# Patient Record
Sex: MALE | Race: WHITE | Employment: FULL TIME | URBAN - METROPOLITAN AREA
[De-identification: names, ages, dates, MRNs, and addresses within clinical notes are randomized per-mention and may not be internally consistent; named-entity substitution may affect disease eponyms.]

---

## 2019-03-21 PROBLEM — Z79.899 DRUG-INDUCED IMMUNODEFICIENCY (HCC): Status: ACTIVE | Noted: 2018-06-28

## 2019-03-21 PROBLEM — S43.431A SUPERIOR GLENOID LABRUM LESION OF RIGHT SHOULDER: Status: ACTIVE | Noted: 2018-06-01

## 2019-03-21 PROBLEM — M48.10 DISH (DIFFUSE IDIOPATHIC SKELETAL HYPEROSTOSIS): Status: ACTIVE | Noted: 2018-06-01

## 2019-03-21 PROBLEM — D84.821 DRUG-INDUCED IMMUNODEFICIENCY (HCC): Status: ACTIVE | Noted: 2018-06-28

## 2019-03-21 PROBLEM — N48.6 PEYRONIE'S DISEASE: Status: ACTIVE | Noted: 2018-10-31

## 2019-03-21 PROBLEM — M72.2 PLANTAR FASCIITIS: Status: ACTIVE | Noted: 2018-06-01

## 2019-03-21 PROBLEM — M47.819 SPONDYLOARTHROPATHY: Status: ACTIVE | Noted: 2018-06-01

## 2019-09-04 ENCOUNTER — HOSPITAL ENCOUNTER (OUTPATIENT)
Dept: LAB | Age: 49
Discharge: HOME | End: 2019-09-04
Payer: COMMERCIAL

## 2019-09-04 ENCOUNTER — HOSPITAL ENCOUNTER (OUTPATIENT)
Dept: GENERAL RADIOLOGY | Age: 49
Discharge: HOME | End: 2019-09-04
Payer: COMMERCIAL

## 2019-09-04 DIAGNOSIS — N48.6 PEYRONIE'S DISEASE: ICD-10-CM

## 2019-09-04 LAB
ANION GAP SERPL CALC-SCNC: 6 MMOL/L (ref 3–18)
BASOPHILS # BLD: 0.1 K/UL (ref 0–0.1)
BASOPHILS NFR BLD: 1 % (ref 0–2)
BUN SERPL-MCNC: 13 MG/DL (ref 7–18)
BUN/CREAT SERPL: 13 (ref 12–20)
CALCIUM SERPL-MCNC: 9.1 MG/DL (ref 8.5–10.1)
CHLORIDE SERPL-SCNC: 105 MMOL/L (ref 100–111)
CO2 SERPL-SCNC: 28 MMOL/L (ref 21–32)
CREAT SERPL-MCNC: 0.97 MG/DL (ref 0.6–1.3)
DIFFERENTIAL METHOD BLD: ABNORMAL
EOSINOPHIL # BLD: 0.1 K/UL (ref 0–0.4)
EOSINOPHIL NFR BLD: 2 % (ref 0–5)
ERYTHROCYTE [DISTWIDTH] IN BLOOD BY AUTOMATED COUNT: 11.5 % (ref 11.6–14.5)
GLUCOSE SERPL-MCNC: 113 MG/DL (ref 74–99)
HCT VFR BLD AUTO: 41.7 % (ref 36–48)
HGB BLD-MCNC: 14.2 G/DL (ref 13–16)
LYMPHOCYTES # BLD: 2.9 K/UL (ref 0.9–3.6)
LYMPHOCYTES NFR BLD: 38 % (ref 21–52)
MCH RBC QN AUTO: 32.5 PG (ref 24–34)
MCHC RBC AUTO-ENTMCNC: 34.1 G/DL (ref 31–37)
MCV RBC AUTO: 95.4 FL (ref 74–97)
MONOCYTES # BLD: 1 K/UL (ref 0.05–1.2)
MONOCYTES NFR BLD: 13 % (ref 3–10)
NEUTS SEG # BLD: 3.6 K/UL (ref 1.8–8)
NEUTS SEG NFR BLD: 46 % (ref 40–73)
PLATELET # BLD AUTO: 335 K/UL (ref 135–420)
PMV BLD AUTO: 10.2 FL (ref 9.2–11.8)
POTASSIUM SERPL-SCNC: 4.6 MMOL/L (ref 3.5–5.5)
RBC # BLD AUTO: 4.37 M/UL (ref 4.7–5.5)
SODIUM SERPL-SCNC: 139 MMOL/L (ref 136–145)
WBC # BLD AUTO: 7.7 K/UL (ref 4.6–13.2)

## 2019-09-04 PROCEDURE — 36415 COLL VENOUS BLD VENIPUNCTURE: CPT

## 2019-09-04 PROCEDURE — 85025 COMPLETE CBC W/AUTO DIFF WBC: CPT

## 2019-09-04 PROCEDURE — 71046 X-RAY EXAM CHEST 2 VIEWS: CPT

## 2019-09-04 PROCEDURE — 80048 BASIC METABOLIC PNL TOTAL CA: CPT

## 2020-01-15 PROBLEM — M54.2 CERVICALGIA: Status: ACTIVE | Noted: 2020-01-10

## 2020-01-15 PROBLEM — M48.061 SPINAL STENOSIS OF LUMBAR REGION: Status: ACTIVE | Noted: 2019-11-13

## 2020-01-15 PROBLEM — G56.03 CARPAL TUNNEL SYNDROME, BILATERAL: Status: ACTIVE | Noted: 2020-01-10

## 2020-12-29 ENCOUNTER — TRANSCRIBE ORDER (OUTPATIENT)
Dept: SCHEDULING | Age: 50
End: 2020-12-29

## 2020-12-29 DIAGNOSIS — R06.02 SHORTNESS OF BREATH: Primary | ICD-10-CM

## 2020-12-29 DIAGNOSIS — R91.8 OTHER NONSPECIFIC ABNORMAL FINDING OF LUNG FIELD: ICD-10-CM

## 2021-01-04 ENCOUNTER — HOSPITAL ENCOUNTER (EMERGENCY)
Age: 51
Discharge: HOME | End: 2021-01-04
Attending: EMERGENCY MEDICINE
Payer: COMMERCIAL

## 2021-01-04 VITALS
OXYGEN SATURATION: 100 % | RESPIRATION RATE: 16 BRPM | DIASTOLIC BLOOD PRESSURE: 80 MMHG | HEART RATE: 88 BPM | TEMPERATURE: 97.3 F | SYSTOLIC BLOOD PRESSURE: 133 MMHG | WEIGHT: 220 LBS | BODY MASS INDEX: 35.36 KG/M2 | HEIGHT: 66 IN

## 2021-01-04 DIAGNOSIS — J06.9 VIRAL UPPER RESPIRATORY TRACT INFECTION: Primary | ICD-10-CM

## 2021-01-04 DIAGNOSIS — Z20.822 PERSON UNDER INVESTIGATION FOR COVID-19: ICD-10-CM

## 2021-01-04 PROCEDURE — 99282 EMERGENCY DEPT VISIT SF MDM: CPT

## 2021-01-04 PROCEDURE — 87635 SARS-COV-2 COVID-19 AMP PRB: CPT

## 2021-01-04 RX ORDER — ALBUTEROL SULFATE 90 UG/1
2 AEROSOL, METERED RESPIRATORY (INHALATION)
Qty: 1 INHALER | Refills: 0 | Status: SHIPPED | OUTPATIENT
Start: 2021-01-04 | End: 2021-02-15 | Stop reason: CLARIF

## 2021-01-04 NOTE — ED PROVIDER NOTES
EMERGENCY DEPARTMENT HISTORY AND PHYSICAL EXAM    Date: 1/4/2021  Patient Name: Melissa Palacio    History of Presenting Illness     Chief Complaint   Patient presents with    Covid Testing         History Provided By: Patient    5:40 PM  Melissa Palacio is a 45 y.o. male with PMHX of hypertension who presents to the emergency department C/O fatigue, sweats, chills, diarrhea, nonproductive cough and body aches which began 4 days ago. Patient is a  and states he received COVID-19 vaccine 5 days ago. Called his PCP, who suggested being tested for COVID-19. Was around a friend who tested positive for COVID-19 but states he was wearing a mask around him. Patient wears CPAP at night but states he has been a little short of breath the last 2 nights. Pt denies fever, vomiting, diarrhea, loss of taste or smell, and any other sxs or complaints. PCP: Jorje Houston MD    Current Outpatient Medications   Medication Sig Dispense Refill    albuterol (PROVENTIL HFA, VENTOLIN HFA, PROAIR HFA) 90 mcg/actuation inhaler Take 2 Puffs by inhalation every four (4) hours as needed for Wheezing. 1 Inhaler 0    ALPRAZolam (XANAX) 1 mg tablet TAKE 1/2 TO 1 TABLET BY MOUTH FOR PANIC ATTACK. NO MORE THAN ONCE DAILY  0    sildenafil, pulm. hypertension, (REVATIO) 20 mg tablet Take 1-5 tabs by mouth one hour prior to sexual activity 90 Tab 3    ibuprofen (MOTRIN) 800 mg tablet TAKE 1 TABLET BY MOUTH 3 TIMES A DAY WITH FOOD  0    gabapentin (NEURONTIN) 300 mg capsule   0    lisinopril (PRINIVIL, ZESTRIL) 20 mg tablet   0    adalimumab (HUMIRA PEN) 40 mg/0.8 mL injection pen by SubCUTAneous route.  metoprolol succinate (TOPROL-XL) 25 mg XL tablet   0    omeprazole (PRILOSEC) 10 mg capsule Take 10 mg by mouth.          Past History     Past Medical History:  Past Medical History:   Diagnosis Date    Hypertension     Peyronie's disease     Umbilical hernia        Past Surgical History:  Past Surgical History: Procedure Laterality Date    HX HERNIA REPAIR      HX SHOULDER ARTHROSCOPY Left     HX UROLOGICAL  09/20/2019    Penis correction/ straightening - [facility] - Dr. Miranda Hackett       Family History:  History reviewed. No pertinent family history. Social History:  Social History     Tobacco Use    Smoking status: Never Smoker    Smokeless tobacco: Never Used   Substance Use Topics    Alcohol use: Yes     Frequency: 2-4 times a month    Drug use: Never       Allergies:  No Known Allergies      Review of Systems   Review of Systems      Physical Exam     Vitals:    01/04/21 1712   BP: 133/80   Pulse: 88   Resp: 16   Temp: 97.3 °F (36.3 °C)   SpO2: 100%   Weight: 99.8 kg (220 lb)   Height: 5' 6\" (1.676 m)     Physical Exam  Vital signs and nursing notes reviewed. CONSTITUTIONAL: Alert. Well-appearing; well-nourished; in no apparent distress. HEAD: Normocephalic; atraumatic. CV: Normal S1, S2; no murmurs, rubs, or gallops. No chest wall tenderness. RESPIRATORY: Normal chest excursion with respiration; breath sounds clear and equal bilaterally; no wheezes, rhonchi, or rales. SKIN: Normal for age and race; warm; dry; good turgor; no apparent lesions or exudate. NEURO: A & O x3. PSYCH:  Mood and affect appropriate. Diagnostic Study Results     Labs -   No results found for this or any previous visit (from the past 12 hour(s)). Radiologic Studies -   No orders to display     CT Results  (Last 48 hours)    None        CXR Results  (Last 48 hours)    None          Medications given in the ED-  Medications - No data to display      Medical Decision Making   I am the first provider for this patient. I reviewed the vital signs, available nursing notes, past medical history, past surgical history, family history and social history. Vital Signs-Reviewed the patient's vital signs.     Records Reviewed: Nursing Notes      Procedures:  Procedures    ED Course:  5:40 PM   Initial assessment performed. The patients presenting problems have been discussed, and they are in agreement with the care plan formulated and outlined with them. I have encouraged them to ask questions as they arise throughout their visit. Provider Notes (Medical Decision Making): Loida Carcamo is a 45 y.o. male 4 days of URI symptoms and possible COVID-19 exposure. Also received first COVID-19 vaccine 5 days ago. Vitals stable. Not hypoxic or tachypneic and exam is normal.  Over-the-counter symptomatic treatment discussed. COVID-19 test sent and pending at this time. Strict isolation per CDC guidelines return precautions discussed. Diagnosis and Disposition       DISCHARGE NOTE:    Cristopher Spangler's  results have been reviewed with him. He has been counseled regarding his diagnosis, treatment, and plan. He verbally conveys understanding and agreement of the signs, symptoms, diagnosis, treatment and prognosis and additionally agrees to follow up as discussed. He also agrees with the care-plan and conveys that all of his questions have been answered. I have also provided discharge instructions for him that include: educational information regarding their diagnosis and treatment, and list of reasons why they would want to return to the ED prior to their follow-up appointment, should his condition change. He has been provided with education for proper emergency department utilization. CLINICAL IMPRESSION:    1. Viral upper respiratory tract infection    2. Person under investigation for COVID-19        PLAN:  1. D/C Home  2. Current Discharge Medication List      START taking these medications    Details   albuterol (PROVENTIL HFA, VENTOLIN HFA, PROAIR HFA) 90 mcg/actuation inhaler Take 2 Puffs by inhalation every four (4) hours as needed for Wheezing. Qty: 1 Inhaler, Refills: 0           3.    Follow-up Information     Follow up With Specialties Details Why Contact Info    Noemi Lira MD Family Medicine  As needed [address] [place] [address]  [phone]      [facility] EMERGENCY DEPT Emergency Medicine  As needed, If symptoms worsen [address] [id number]  [phone]        _______________________________      Please note that this dictation was completed with [employer], the computer voice recognition software. Quite often unanticipated grammatical, syntax, homophones, and other interpretive errors are inadvertently transcribed by the computer software. Please disregard these errors. Please excuse any errors that have escaped final proofreading.

## 2021-01-05 ENCOUNTER — PATIENT OUTREACH (OUTPATIENT)
Dept: CASE MANAGEMENT | Age: 51
End: 2021-01-05

## 2021-01-05 LAB
SARS-COV-2, COV2NT: NOT DETECTED
SOURCE, COVRS: NORMAL
SPECIMEN TYPE, XMCV1T: NORMAL

## 2021-01-12 ENCOUNTER — HOSPITAL ENCOUNTER (OUTPATIENT)
Dept: PET IMAGING | Age: 51
Discharge: HOME | End: 2021-01-12
Attending: NURSE PRACTITIONER
Payer: COMMERCIAL

## 2021-01-12 DIAGNOSIS — R06.02 SHORTNESS OF BREATH: ICD-10-CM

## 2021-01-12 DIAGNOSIS — R91.8 OTHER NONSPECIFIC ABNORMAL FINDING OF LUNG FIELD: ICD-10-CM

## 2021-01-12 PROCEDURE — 78815 PET IMAGE W/CT SKULL-THIGH: CPT

## 2021-02-15 NOTE — H&P
39-year-old male with autoimmune disorder presents with severe lymphadenopathy. Patient used to be on immunosuppression including Humira. Lymphadenopathy could be related to lymphoma or through to out immune disorder including sarcoidosis. Biopsy plan. All discussed with patient. With patient to hold taking aspirin and ibuprofen prior to procedure.   Lukas Grubbs MD

## 2021-02-16 ENCOUNTER — HOSPITAL ENCOUNTER (OUTPATIENT)
Age: 51
Setting detail: OUTPATIENT SURGERY
Discharge: HOME | End: 2021-02-16
Attending: INTERNAL MEDICINE | Admitting: INTERNAL MEDICINE
Payer: COMMERCIAL

## 2021-02-16 ENCOUNTER — APPOINTMENT (OUTPATIENT)
Dept: GENERAL RADIOLOGY | Age: 51
End: 2021-02-16
Attending: INTERNAL MEDICINE
Payer: COMMERCIAL

## 2021-02-16 ENCOUNTER — ANESTHESIA (OUTPATIENT)
Dept: ENDOSCOPY | Age: 51
End: 2021-02-16
Payer: COMMERCIAL

## 2021-02-16 ENCOUNTER — ANESTHESIA EVENT (OUTPATIENT)
Dept: ENDOSCOPY | Age: 51
End: 2021-02-16
Payer: COMMERCIAL

## 2021-02-16 VITALS
SYSTOLIC BLOOD PRESSURE: 118 MMHG | WEIGHT: 217 LBS | DIASTOLIC BLOOD PRESSURE: 82 MMHG | HEIGHT: 66 IN | TEMPERATURE: 97.6 F | OXYGEN SATURATION: 98 % | HEART RATE: 93 BPM | BODY MASS INDEX: 34.87 KG/M2 | RESPIRATION RATE: 15 BRPM

## 2021-02-16 LAB
ANION GAP SERPL CALC-SCNC: 7 MMOL/L (ref 3–18)
APTT PPP: 29.6 SEC (ref 23–36.4)
ATRIAL RATE: 79 BPM
BRONCH. LAVAGE DIFF.,BR: NORMAL
BUN SERPL-MCNC: 13 MG/DL (ref 7–18)
BUN/CREAT SERPL: 18 (ref 12–20)
CALCIUM SERPL-MCNC: 9.3 MG/DL (ref 8.5–10.1)
CALCULATED P AXIS, ECG09: 43 DEGREES
CALCULATED R AXIS, ECG10: 54 DEGREES
CALCULATED T AXIS, ECG11: 10 DEGREES
CHLORIDE SERPL-SCNC: 104 MMOL/L (ref 100–111)
CO2 SERPL-SCNC: 26 MMOL/L (ref 21–32)
COVID-19 RAPID TEST, COVR: NOT DETECTED
CREAT SERPL-MCNC: 0.71 MG/DL (ref 0.6–1.3)
DIAGNOSIS, 93000: NORMAL
EOSINOPHIL NFR BRONCH MANUAL: 0 %
GLUCOSE SERPL-MCNC: 172 MG/DL (ref 74–99)
HCT VFR BLD AUTO: 42.3 % (ref 36–48)
HGB BLD-MCNC: 14.6 G/DL (ref 13–16)
INR PPP: 1 (ref 0.8–1.2)
LYMPHOCYTES NFR BRONCH MANUAL: 43 %
MACROPHAGES NFR BRONCH MANUAL: 0 %
NEUTROPHILS NFR BRONCH MANUAL: 57 %
P-R INTERVAL, ECG05: 156 MS
POTASSIUM SERPL-SCNC: 4.5 MMOL/L (ref 3.5–5.5)
PROTHROMBIN TIME: 13.1 SEC (ref 11.5–15.2)
Q-T INTERVAL, ECG07: 368 MS
QRS DURATION, ECG06: 102 MS
QTC CALCULATION (BEZET), ECG08: 421 MS
SARS-COV-2, COV2: NORMAL
SODIUM SERPL-SCNC: 137 MMOL/L (ref 136–145)
SOURCE, COVRS: NORMAL
VENTRICULAR RATE, ECG03: 79 BPM

## 2021-02-16 PROCEDURE — 77030003454 HC NDL BIOP BRONCH BSC -B: Performed by: INTERNAL MEDICINE

## 2021-02-16 PROCEDURE — 77030040361 HC SLV COMPR DVT MDII -B: Performed by: INTERNAL MEDICINE

## 2021-02-16 PROCEDURE — 80048 BASIC METABOLIC PNL TOTAL CA: CPT

## 2021-02-16 PROCEDURE — 77030022556 HC FCPS BIOP TIS ENDOSC BSC -B: Performed by: INTERNAL MEDICINE

## 2021-02-16 PROCEDURE — 71045 X-RAY EXAM CHEST 1 VIEW: CPT

## 2021-02-16 PROCEDURE — 87206 SMEAR FLUORESCENT/ACID STAI: CPT

## 2021-02-16 PROCEDURE — 88305 TISSUE EXAM BY PATHOLOGIST: CPT

## 2021-02-16 PROCEDURE — 76060000033 HC ANESTHESIA 1 TO 1.5 HR: Performed by: INTERNAL MEDICINE

## 2021-02-16 PROCEDURE — U0003 INFECTIOUS AGENT DETECTION BY NUCLEIC ACID (DNA OR RNA); SEVERE ACUTE RESPIRATORY SYNDROME CORONAVIRUS 2 (SARS-COV-2) (CORONAVIRUS DISEASE [COVID-19]), AMPLIFIED PROBE TECHNIQUE, MAKING USE OF HIGH THROUGHPUT TECHNOLOGIES AS DESCRIBED BY CMS-2020-01-R: HCPCS

## 2021-02-16 PROCEDURE — 93005 ELECTROCARDIOGRAM TRACING: CPT

## 2021-02-16 PROCEDURE — 85610 PROTHROMBIN TIME: CPT

## 2021-02-16 PROCEDURE — 85730 THROMBOPLASTIN TIME PARTIAL: CPT

## 2021-02-16 PROCEDURE — 77030006643: Performed by: ANESTHESIOLOGY

## 2021-02-16 PROCEDURE — 74011000258 HC RX REV CODE- 258: Performed by: NURSE ANESTHETIST, CERTIFIED REGISTERED

## 2021-02-16 PROCEDURE — 87205 SMEAR GRAM STAIN: CPT

## 2021-02-16 PROCEDURE — 85018 HEMOGLOBIN: CPT

## 2021-02-16 PROCEDURE — 88185 FLOWCYTOMETRY/TC ADD-ON: CPT

## 2021-02-16 PROCEDURE — 77030008477 HC STYL SATN SLP COVD -A: Performed by: ANESTHESIOLOGY

## 2021-02-16 PROCEDURE — 87116 MYCOBACTERIA CULTURE: CPT

## 2021-02-16 PROCEDURE — 74011000250 HC RX REV CODE- 250: Performed by: NURSE ANESTHETIST, CERTIFIED REGISTERED

## 2021-02-16 PROCEDURE — 87102 FUNGUS ISOLATION CULTURE: CPT

## 2021-02-16 PROCEDURE — 88112 CYTOPATH CELL ENHANCE TECH: CPT

## 2021-02-16 PROCEDURE — 77030003400 HC NDL ASPIR BIOP CNMD -B: Performed by: INTERNAL MEDICINE

## 2021-02-16 PROCEDURE — 74011250636 HC RX REV CODE- 250/636: Performed by: NURSE ANESTHETIST, CERTIFIED REGISTERED

## 2021-02-16 PROCEDURE — 77030018823 HC SLV COMPR VENO -B: Performed by: INTERNAL MEDICINE

## 2021-02-16 PROCEDURE — 76040000008: Performed by: INTERNAL MEDICINE

## 2021-02-16 PROCEDURE — 74011250636 HC RX REV CODE- 250/636: Performed by: INTERNAL MEDICINE

## 2021-02-16 PROCEDURE — 88172 CYTP DX EVAL FNA 1ST EA SITE: CPT

## 2021-02-16 PROCEDURE — 77030013140 HC MSK NEB VYRM -A: Performed by: INTERNAL MEDICINE

## 2021-02-16 PROCEDURE — 77030012699 HC VLV SUC CNTRL OCOA -A: Performed by: INTERNAL MEDICINE

## 2021-02-16 PROCEDURE — 2709999900 HC NON-CHARGEABLE SUPPLY: Performed by: INTERNAL MEDICINE

## 2021-02-16 PROCEDURE — 36415 COLL VENOUS BLD VENIPUNCTURE: CPT

## 2021-02-16 PROCEDURE — 88184 FLOWCYTOMETRY/ TC 1 MARKER: CPT

## 2021-02-16 PROCEDURE — 89051 BODY FLUID CELL COUNT: CPT

## 2021-02-16 PROCEDURE — 88173 CYTOPATH EVAL FNA REPORT: CPT

## 2021-02-16 PROCEDURE — 87635 SARS-COV-2 COVID-19 AMP PRB: CPT

## 2021-02-16 RX ORDER — ONDANSETRON 2 MG/ML
INJECTION INTRAMUSCULAR; INTRAVENOUS AS NEEDED
Status: DISCONTINUED | OUTPATIENT
Start: 2021-02-16 | End: 2021-02-16 | Stop reason: HOSPADM

## 2021-02-16 RX ORDER — SODIUM CHLORIDE 0.9 % (FLUSH) 0.9 %
5-40 SYRINGE (ML) INJECTION AS NEEDED
Status: CANCELLED | OUTPATIENT
Start: 2021-02-16

## 2021-02-16 RX ORDER — FENTANYL CITRATE 50 UG/ML
25 INJECTION, SOLUTION INTRAMUSCULAR; INTRAVENOUS AS NEEDED
Status: CANCELLED | OUTPATIENT
Start: 2021-02-16

## 2021-02-16 RX ORDER — ALBUTEROL SULFATE 0.83 MG/ML
2.5 SOLUTION RESPIRATORY (INHALATION)
Status: CANCELLED | OUTPATIENT
Start: 2021-02-16 | End: 2021-02-17

## 2021-02-16 RX ORDER — SODIUM CHLORIDE 9 MG/ML
500 INJECTION, SOLUTION INTRAVENOUS CONTINUOUS
Status: DISCONTINUED | OUTPATIENT
Start: 2021-02-16 | End: 2021-02-16 | Stop reason: HOSPADM

## 2021-02-16 RX ORDER — SUCCINYLCHOLINE CHLORIDE 100 MG/5ML
SYRINGE (ML) INTRAVENOUS AS NEEDED
Status: DISCONTINUED | OUTPATIENT
Start: 2021-02-16 | End: 2021-02-16 | Stop reason: HOSPADM

## 2021-02-16 RX ORDER — LIDOCAINE HYDROCHLORIDE 20 MG/ML
5 SOLUTION OROPHARYNGEAL ONCE
Status: CANCELLED | OUTPATIENT
Start: 2021-02-16 | End: 2021-02-16

## 2021-02-16 RX ORDER — PROPOFOL 10 MG/ML
INJECTION, EMULSION INTRAVENOUS AS NEEDED
Status: DISCONTINUED | OUTPATIENT
Start: 2021-02-16 | End: 2021-02-16 | Stop reason: HOSPADM

## 2021-02-16 RX ORDER — HYDROMORPHONE HYDROCHLORIDE 2 MG/ML
0.5 INJECTION, SOLUTION INTRAMUSCULAR; INTRAVENOUS; SUBCUTANEOUS
Status: CANCELLED | OUTPATIENT
Start: 2021-02-16

## 2021-02-16 RX ORDER — SODIUM CHLORIDE 0.9 % (FLUSH) 0.9 %
5-40 SYRINGE (ML) INJECTION EVERY 8 HOURS
Status: CANCELLED | OUTPATIENT
Start: 2021-02-16

## 2021-02-16 RX ORDER — SODIUM CHLORIDE, SODIUM LACTATE, POTASSIUM CHLORIDE, CALCIUM CHLORIDE 600; 310; 30; 20 MG/100ML; MG/100ML; MG/100ML; MG/100ML
100 INJECTION, SOLUTION INTRAVENOUS CONTINUOUS
Status: CANCELLED | OUTPATIENT
Start: 2021-02-16

## 2021-02-16 RX ORDER — NALOXONE HYDROCHLORIDE 0.4 MG/ML
0.1 INJECTION, SOLUTION INTRAMUSCULAR; INTRAVENOUS; SUBCUTANEOUS AS NEEDED
Status: CANCELLED | OUTPATIENT
Start: 2021-02-16

## 2021-02-16 RX ORDER — IPRATROPIUM BROMIDE AND ALBUTEROL SULFATE 2.5; .5 MG/3ML; MG/3ML
3 SOLUTION RESPIRATORY (INHALATION)
Status: DISCONTINUED | OUTPATIENT
Start: 2021-02-16 | End: 2021-02-16 | Stop reason: HOSPADM

## 2021-02-16 RX ORDER — DIPHENHYDRAMINE HYDROCHLORIDE 50 MG/ML
12.5 INJECTION, SOLUTION INTRAMUSCULAR; INTRAVENOUS
Status: CANCELLED | OUTPATIENT
Start: 2021-02-16

## 2021-02-16 RX ORDER — DEXAMETHASONE SODIUM PHOSPHATE 4 MG/ML
INJECTION, SOLUTION INTRA-ARTICULAR; INTRALESIONAL; INTRAMUSCULAR; INTRAVENOUS; SOFT TISSUE AS NEEDED
Status: DISCONTINUED | OUTPATIENT
Start: 2021-02-16 | End: 2021-02-16 | Stop reason: HOSPADM

## 2021-02-16 RX ORDER — LIDOCAINE HYDROCHLORIDE 20 MG/ML
JELLY TOPICAL ONCE
Status: CANCELLED | OUTPATIENT
Start: 2021-02-16 | End: 2021-02-16

## 2021-02-16 RX ORDER — MIDAZOLAM HYDROCHLORIDE 1 MG/ML
INJECTION, SOLUTION INTRAMUSCULAR; INTRAVENOUS AS NEEDED
Status: DISCONTINUED | OUTPATIENT
Start: 2021-02-16 | End: 2021-02-16 | Stop reason: HOSPADM

## 2021-02-16 RX ORDER — EPHEDRINE SULFATE/0.9% NACL/PF 50 MG/5 ML
SYRINGE (ML) INTRAVENOUS AS NEEDED
Status: DISCONTINUED | OUTPATIENT
Start: 2021-02-16 | End: 2021-02-16 | Stop reason: HOSPADM

## 2021-02-16 RX ORDER — FENTANYL CITRATE 50 UG/ML
INJECTION, SOLUTION INTRAMUSCULAR; INTRAVENOUS AS NEEDED
Status: DISCONTINUED | OUTPATIENT
Start: 2021-02-16 | End: 2021-02-16 | Stop reason: HOSPADM

## 2021-02-16 RX ORDER — LIDOCAINE HYDROCHLORIDE 20 MG/ML
INJECTION, SOLUTION EPIDURAL; INFILTRATION; INTRACAUDAL; PERINEURAL AS NEEDED
Status: DISCONTINUED | OUTPATIENT
Start: 2021-02-16 | End: 2021-02-16 | Stop reason: HOSPADM

## 2021-02-16 RX ADMIN — SODIUM CHLORIDE: 900 INJECTION, SOLUTION INTRAVENOUS at 14:40

## 2021-02-16 RX ADMIN — LIDOCAINE HYDROCHLORIDE 100 MG: 20 INJECTION, SOLUTION EPIDURAL; INFILTRATION; INTRACAUDAL; PERINEURAL at 14:30

## 2021-02-16 RX ADMIN — PHENYLEPHRINE HYDROCHLORIDE 200 MCG: 10 INJECTION INTRAVENOUS at 14:55

## 2021-02-16 RX ADMIN — PROPOFOL 50 MG: 10 INJECTION, EMULSION INTRAVENOUS at 15:19

## 2021-02-16 RX ADMIN — Medication 10 MG: at 14:59

## 2021-02-16 RX ADMIN — Medication 10 MG: at 15:28

## 2021-02-16 RX ADMIN — FENTANYL CITRATE 50 MCG: 50 INJECTION, SOLUTION INTRAMUSCULAR; INTRAVENOUS at 15:14

## 2021-02-16 RX ADMIN — ONDANSETRON HYDROCHLORIDE 4 MG: 2 INJECTION INTRAMUSCULAR; INTRAVENOUS at 14:50

## 2021-02-16 RX ADMIN — SODIUM CHLORIDE: 900 INJECTION, SOLUTION INTRAVENOUS at 14:25

## 2021-02-16 RX ADMIN — PROPOFOL 200 MG: 10 INJECTION, EMULSION INTRAVENOUS at 14:30

## 2021-02-16 RX ADMIN — PHENYLEPHRINE HYDROCHLORIDE 100 MCG: 10 INJECTION INTRAVENOUS at 14:48

## 2021-02-16 RX ADMIN — PHENYLEPHRINE HYDROCHLORIDE 200 MCG: 10 INJECTION INTRAVENOUS at 14:56

## 2021-02-16 RX ADMIN — DEXAMETHASONE SODIUM PHOSPHATE 4 MG: 4 INJECTION, SOLUTION INTRAMUSCULAR; INTRAVENOUS at 15:33

## 2021-02-16 RX ADMIN — Medication 160 MG: at 14:30

## 2021-02-16 RX ADMIN — MIDAZOLAM HYDROCHLORIDE 2 MG: 1 INJECTION, SOLUTION INTRAMUSCULAR; INTRAVENOUS at 14:25

## 2021-02-16 RX ADMIN — DEXAMETHASONE SODIUM PHOSPHATE 4 MG: 4 INJECTION, SOLUTION INTRAMUSCULAR; INTRAVENOUS at 14:50

## 2021-02-16 RX ADMIN — PHENYLEPHRINE HYDROCHLORIDE 100 MCG: 10 INJECTION INTRAVENOUS at 14:38

## 2021-02-16 RX ADMIN — FENTANYL CITRATE 50 MCG: 50 INJECTION, SOLUTION INTRAMUSCULAR; INTRAVENOUS at 14:51

## 2021-02-16 RX ADMIN — PHENYLEPHRINE HYDROCHLORIDE 200 MCG: 10 INJECTION INTRAVENOUS at 15:30

## 2021-02-16 RX ADMIN — FENTANYL CITRATE 100 MCG: 50 INJECTION, SOLUTION INTRAMUSCULAR; INTRAVENOUS at 14:28

## 2021-02-16 NOTE — DISCHARGE INSTRUCTIONS
Patient Education        Bronchoscopy: What to Expect at Home  Your Recovery     Bronchoscopy lets your doctor look at your airway through a tube called a bronchoscope. Afterward, you may feel tired for 1 or 2 days. Your mouth may feel very dry for several hours after the procedure. You may also have a sore throat and a hoarse voice for a few days. Sucking on throat lozenges or gargling with warm salt water may help soothe your sore throat. If a sample of tissue (biopsy) was taken, you may spit up a small amount of blood or have bloody saliva. This is normal.  Do not drive for at least 8 hours after the procedure. Do not smoke for at least 24 hours. This care sheet gives you a general idea about how long it will take for you to recover. But each person recovers at a different pace. Follow the steps below to get better as quickly as possible. How can you care for yourself at home? Activity    · Do not eat anything for 2 hours after the procedure.     · Rest when you feel tired. Getting enough sleep will help you recover.     · Avoid strenuous activities, such as bicycle riding, jogging, weight lifting, or aerobic exercise, until your doctor says it is okay.     · Ask your doctor when you can drive again. Diet    · You can eat your normal diet. If your stomach is upset, try bland, low-fat foods like plain rice, broiled chicken, toast, and yogurt.     · If it is painful to swallow, start out with cold drinks, flavored ice pops, and ice cream. Next, try soft foods like pudding, yogurt, canned or cooked fruit, scrambled eggs, and mashed potatoes. Avoid eating hard or scratchy foods like chips or raw vegetables. Avoid orange or tomato juice and other acidic foods that can sting the throat.     · Drink plenty of fluids to avoid becoming dehydrated (unless your doctor tells you not to). Medicines    · Take pain medicines exactly as directed.   ? If the doctor gave you a prescription medicine for pain, take it as prescribed. ? If you are not taking a prescription pain medicine, ask your doctor if you can take an over-the-counter medicine.     · If you think your pain medicine is making you sick to your stomach:  ? Take your medicine after meals (unless your doctor has told you not to). ? Ask your doctor for a different pain medicine.     · If your doctor prescribed antibiotics, take them as directed. Do not stop taking them just because you feel better. You need to take the full course of antibiotics. Follow-up care is a key part of your treatment and safety. Be sure to make and go to all appointments, and call your doctor if you are having problems. It's also a good idea to know your test results and keep a list of the medicines you take. When should you call for help? Call 911 anytime you think you may need emergency care. For example, call if:    · You passed out (lost consciousness).     · You have sudden chest pain and shortness of breath.     · You cough up large amounts of bright red blood.     · You have severe pain in your chest.     · You have severe trouble breathing. Call your doctor now or seek immediate medical care if:    · You cough up more than a few tablespoons of blood.     · You have pain that does not get better after you take pain medicine.     · You have a fever over 100°F.     · You still sound hoarse after a few days.     · You have bubbles under the skin around the collarbone. These may crackle and pop when you press on them. Watch closely for changes in your health, and be sure to contact your doctor if you have any problems. Where can you learn more? Go to http://www.gray.com/  Enter L061 in the search box to learn more about \"Bronchoscopy: What to Expect at Home. \"  Current as of: February 24, 2020               Content Version: 12.6  © 2451-2797 [employer].    Care instructions adapted under license by [employer] (which disclaims liability or warranty for this information). If you have questions about a medical condition or this instruction, always ask your healthcare professional. [address] any warranty or liability for your use of this information. DISCHARGE SUMMARY from Nurse    PATIENT INSTRUCTIONS:    After general anesthesia or intravenous sedation, for 24 hours or while taking prescription Narcotics:  · Limit your activities  · Do not drive and operate hazardous machinery  · Do not make important personal or business decisions  · Do  not drink alcoholic beverages  · If you have not urinated within 8 hours after discharge, please contact your surgeon on call. Report the following to your surgeon:  · Excessive pain, swelling, redness or odor of or around the surgical area  · Temperature over 100.5  · Nausea and vomiting lasting longer than 4 hours or if unable to take medications  · Any signs of decreased circulation or nerve impairment to extremity: change in color, persistent  numbness, tingling, coldness or increase pain  · Any questions    What to do at Home:  Recommended activity: Activity as tolerated and no driving for today    *  Please give a list of your current medications to your Primary Care Provider. *  Please update this list whenever your medications are discontinued, doses are      changed, or new medications (including over-the-counter products) are added. *  Please carry medication information at all times in case of emergency situations. These are general instructions for a healthy lifestyle:    No smoking/ No tobacco products/ Avoid exposure to second hand smoke  Surgeon General's Warning:  Quitting smoking now greatly reduces serious risk to your health.     Obesity, smoking, and sedentary lifestyle greatly increases your risk for illness    A healthy diet, regular physical exercise & weight monitoring are important for maintaining a healthy lifestyle    You may be retaining fluid if you have a history of heart failure or if you experience any of the following symptoms:  Weight gain of 3 pounds or more overnight or 5 pounds in a week, increased swelling in our hands or feet or shortness of breath while lying flat in bed. Please call your doctor as soon as you notice any of these symptoms; do not wait until your next office visit. The discharge information has been reviewed with the patient and spouse. The patient and spouse verbalized understanding. Discharge medications reviewed with the patient and spouse and appropriate educational materials and side effects teaching were provided.   _______    Patient armband removed and shredded  ____________________________________________________________________________________________________________________________

## 2021-02-16 NOTE — PERIOP NOTES
Dr. Jose Armando Grullon at bedside with patient giving verbal discharge instructions to patient. Patient and spouse verbalized understanding and asked no questions. Written discharge instructions given to patient by RN. Patient and spouse verbalized understanding regarding the instructions related to eating and drinking after procedure. Patient anxious to go and attempted to leave without being wheeled out by RN. Patient wheeled out via wheelchair.

## 2021-02-16 NOTE — PROCEDURES
[facility]                  Pulmonary, Critical Care, and Sleep Medicine     Bronchoscopy with Endobronchial Ultrasound-Guided Transbronchial Needle Aspiration and BAL      Pre-procedure diagnosis  · Mediatsinal lymphadenopathy. Station 7   · Hilar lymphadenopathy. Station [address] Problems  Date Reviewed: 1/16/2020    None            Post procedure diagnosis  · Same    Procedures  · EBUS Bronchoscopy . · Flexible Fiberoptic Diagnostic Bronchoscopy. · EBUS guided TBNA of lymph node station 7     5 passes  · EBUS guided TBNA of lymph node station 10 R 1 pass  · BAL from LLL lobe. ·     Consent/Treatment: Informed consent was obtained from the  patient after risks, benefits and alternatives were explained. Timeout verified the correct patient and correct procedure. Anesthesia:   General anesthesia was performed by anesthesiology, 8.5 mm ETTProcedure Details:   ETT  Once vital signs were verified, the endobronchial ultrasound bronchoscope was inserted through the ETT without difficulty into mid trachea. Mediastinal and bilateral hilar inspection done using EBUS and color Doppler:  EBUS guided TBNA done using 25 G needle after confirming the station location and verification by color Doppler:     Station Size (cm) Order of TBNA TBNA (passes)   2R      2L      4R (right paratracheal)      4l (left paratracheal)      7 (subcarinal)  1.3 5   10R (right hilar)   1.1 1   10L (left hilar)      11R (right infrahilar)      11L (left infrahilar)               All TBNA sent for slides and cell block. Procedure #2 Flexible Fiberoptic Bronchoscopy: The endobronchial ultrasound bronchoscope was then withdrawn with intact balloon and the regular flexible fiberoptic bronchoscope was inserted through the ETT and the following airway surveillance done.   inflmmation no endobronchial lesion    Airways surveillance/specimens and further procedure:     Observation Washing for Cytology BAL for Cytology and Microbiology Endobronchial Biopsy for Pathology   Vocal Cord Not seen due to ETT. Trachea No mass/nodule/compression      RUL No mass/nodule/compression      RML No mass/nodule/compression      RLL No mass/nodule/compression      MARYAM No mass/nodule/compression      Lingula No mass/nodule/compression      LLL No mass/nodule/compression inflmmation  infl;mmation    TBNA Site No active bleeding               The bronchoscope was then removed and the procedure completed. Rapid On-Site Evaluation:   A preliminary diagnosis of normal lymphocytes  and final diagnosis is pending. Preliminary report from slides showed normal lymphocytes no malignant cells and final report pending. Complications: none. Balloon was intact on EBUS. Vital signs remained stable throughout the procedure. Patient was woken up in endoscopy suite and then transferred to recovery area in a stable condition. Family members were updated by me. Estimated Blood Loss: < 5 cc. PLAN:  · Transferred to PACU in stable condition. · CXR stat. · NPO till 2  Hrs   · Await path results. · Discharge home when discharge criteria met. · Follow up with Pulmonary clinic in 7-10 days. · D/w family in detail, photos shown.         Rena Anderson MD  2/16/2021 4:20 PM

## 2021-02-16 NOTE — PROGRESS NOTES
After bronchoscopy, cough, fever and respiratory secretions are expected for 6 to 24 hours. Please use simple analgesics such as Tylenol if needed. If biopsies were done, some sputum mixed with blood clots will be seen. If you are coughing felicita blood or having chest pain or shortness of breath - please call 911 and/or call the office   It is recommended to take rest the day of the procedure after going home. Since sedation was used, you would feel sleepy. It is recommended not to drive or operate machinery the day of the procedure. Light meal advised for the very first meal post-procedure. Normal meal can be taken thereafter. If the procedure was done with breathing tube and general anesthesia, some sore throat is expected for 24 to 48 hours.     Alexandra Castañeda MD

## 2021-02-16 NOTE — H&P
59-year-old male with ankylosing spondylitis, bilateral lymphadenopathy, including mediastinal and hilar abnormal CT in the PET scan is here for EBUS biopsy. Risk of the procedure discussed. EBUS with aspirates of med LNs and transbronchial lung biopsies using fluoroscopy. Discussed procedure which will be done with GA and david-tracheal tube, discussed complications such as bleeding and air leak although will be minimized using EBUS and Flouroscopy. Discussed limitations of PET scan at this stage. Discussed role of mediastinoscopy if bronch non-diagnostic. Patient agreeable for procedure, discussed npo after midnight. Labs day of procedure. Covid negative.   Anuj Barnes MD

## 2021-02-16 NOTE — ANESTHESIA PREPROCEDURE EVALUATION
Relevant Problems   No relevant active problems       Anesthetic History   No history of anesthetic complications            Review of Systems / Medical History  Patient summary reviewed and pertinent labs reviewed    Pulmonary        Sleep apnea: CPAP      Pertinent negatives: No smoker     Neuro/Psych         Psychiatric history     Cardiovascular    Hypertension              Exercise tolerance: >4 METS     GI/Hepatic/Renal     GERD: well controlled        Pertinent negatives: No hiatal hernia   Endo/Other  Within defined limits           Other Findings   Comments: Rheumatoid arthritis           Physical Exam    Airway  Mallampati: II           Cardiovascular    Rhythm: regular  Rate: normal         Dental  No notable dental hx       Pulmonary  Breath sounds clear to auscultation               Abdominal  GI exam deferred       Other Findings            Anesthetic Plan    ASA: 2  Anesthesia type: general          Induction: Intravenous  Anesthetic plan and risks discussed with: Patient

## 2021-02-16 NOTE — ANESTHESIA POSTPROCEDURE EVALUATION
Procedure(s):  ENDOSCOPIC BRONCHOSCOPY ULTRASOUND (EBUS) WITH C-ARM  **SPEC POP** PT NEEDS RAPID  COVID TEST MORNING OF SURGERY. general    Anesthesia Post Evaluation      Multimodal analgesia: multimodal analgesia not used between 6 hours prior to anesthesia start to PACU discharge  Patient location during evaluation: PACU  Patient participation: complete - patient participated  Level of consciousness: awake  Pain score: 0  Airway patency: patent  Anesthetic complications: no  Cardiovascular status: acceptable  Respiratory status: acceptable  Hydration status: acceptable  Post anesthesia nausea and vomiting:  none  Final Post Anesthesia Temperature Assessment:  Normothermia (36.0-37.5 degrees C)      INITIAL Post-op Vital signs:   Vitals Value Taken Time   /63 02/16/21 1615   Temp 36.4 °C (97.6 °F) 02/16/21 1551   Pulse 97 02/16/21 1617   Resp 15 02/16/21 1617   SpO2 100 % 02/16/21 1617   Vitals shown include unvalidated device data.

## 2021-02-18 LAB
BACTERIA SPEC CULT: NORMAL
GRAM STN SPEC: NORMAL
SARS-COV-2, COV2NT: NOT DETECTED
SERVICE CMNT-IMP: NORMAL

## 2021-03-22 LAB
BACTERIA SPEC CULT: NORMAL
BACTERIA SPEC CULT: NORMAL
SERVICE CMNT-IMP: NORMAL
SERVICE CMNT-IMP: NORMAL

## 2021-04-04 LAB
ACID FAST STN SPEC: NEGATIVE
ACID FAST STN SPEC: NEGATIVE
MYCOBACTERIUM SPEC QL CULT: NEGATIVE
MYCOBACTERIUM SPEC QL CULT: NEGATIVE
SPECIMEN PREPARATION: NORMAL
SPECIMEN PREPARATION: NORMAL
SPECIMEN SOURCE: NORMAL
SPECIMEN SOURCE: NORMAL

## 2022-08-07 ENCOUNTER — APPOINTMENT (OUTPATIENT)
Dept: GENERAL RADIOLOGY | Age: 52
End: 2022-08-07
Attending: FAMILY MEDICINE

## 2022-08-07 ENCOUNTER — HOSPITAL ENCOUNTER (EMERGENCY)
Age: 52
Discharge: HOME | End: 2022-08-07
Attending: EMERGENCY MEDICINE

## 2022-08-07 VITALS
TEMPERATURE: 98.8 F | HEIGHT: 66 IN | WEIGHT: 213 LBS | BODY MASS INDEX: 34.23 KG/M2 | HEART RATE: 100 BPM | RESPIRATION RATE: 19 BRPM | SYSTOLIC BLOOD PRESSURE: 130 MMHG | DIASTOLIC BLOOD PRESSURE: 82 MMHG | OXYGEN SATURATION: 95 %

## 2022-08-07 DIAGNOSIS — K21.9 GASTROESOPHAGEAL REFLUX DISEASE, UNSPECIFIED WHETHER ESOPHAGITIS PRESENT: Primary | ICD-10-CM

## 2022-08-07 LAB
ALBUMIN SERPL-MCNC: 4.9 G/DL (ref 3.5–5.2)
ALBUMIN/GLOB SERPL: 1.8 {RATIO} (ref 1.1–2.2)
ALP SERPL-CCNC: 81 U/L (ref 40–129)
ALT SERPL-CCNC: 49 U/L (ref 10–50)
ANION GAP SERPL CALC-SCNC: 14 MMOL/L (ref 5–15)
AST SERPL-CCNC: 26 U/L (ref 10–50)
BASOPHILS # BLD: 0.1 K/UL (ref 0–0.1)
BASOPHILS NFR BLD: 1 % (ref 0–1)
BILIRUB SERPL-MCNC: 0.2 MG/DL (ref 0.2–1)
BUN SERPL-MCNC: 14 MG/DL (ref 6–20)
BUN/CREAT SERPL: 17 (ref 12–20)
CALCIUM SERPL-MCNC: 10.3 MG/DL (ref 8.6–10)
CHLORIDE SERPL-SCNC: 98 MMOL/L (ref 98–107)
CO2 SERPL-SCNC: 27 MMOL/L (ref 22–29)
COMMENT, HOLDF: NORMAL
CREAT SERPL-MCNC: 0.81 MG/DL (ref 0.7–1.2)
DIFFERENTIAL METHOD BLD: ABNORMAL
EOSINOPHIL # BLD: 0.1 K/UL (ref 0–0.4)
EOSINOPHIL NFR BLD: 1 % (ref 0–7)
ERYTHROCYTE [DISTWIDTH] IN BLOOD BY AUTOMATED COUNT: 11 % (ref 11.5–14.5)
GLOBULIN SER CALC-MCNC: 2.7 G/DL (ref 2–4)
GLUCOSE SERPL-MCNC: 281 MG/DL (ref 65–100)
HCT VFR BLD AUTO: 42 % (ref 36.6–50.3)
HGB BLD-MCNC: 14.7 G/DL (ref 12.1–17)
IMM GRANULOCYTES # BLD AUTO: 0 K/UL (ref 0–0.04)
IMM GRANULOCYTES NFR BLD AUTO: 0 % (ref 0–0.5)
LIPASE SERPL-CCNC: 42 U/L (ref 13–60)
LYMPHOCYTES # BLD: 2.9 K/UL (ref 0.8–3.5)
LYMPHOCYTES NFR BLD: 37 % (ref 12–49)
MCH RBC QN AUTO: 32.7 PG (ref 26–34)
MCHC RBC AUTO-ENTMCNC: 35 G/DL (ref 30–36.5)
MCV RBC AUTO: 93.5 FL (ref 80–99)
MONOCYTES # BLD: 0.7 K/UL (ref 0–1)
MONOCYTES NFR BLD: 9 % (ref 5–13)
NEUTS SEG # BLD: 3.9 K/UL (ref 1.8–8)
NEUTS SEG NFR BLD: 51 % (ref 32–75)
NRBC # BLD: 0 K/UL (ref 0–0.01)
NRBC BLD-RTO: 0 PER 100 WBC
PLATELET # BLD AUTO: 328 K/UL (ref 150–400)
PMV BLD AUTO: 10.3 FL (ref 8.9–12.9)
POTASSIUM SERPL-SCNC: 4.5 MMOL/L (ref 3.5–5.1)
PROT SERPL-MCNC: 7.6 G/DL (ref 6.4–8.3)
RBC # BLD AUTO: 4.49 M/UL (ref 4.1–5.7)
SAMPLES BEING HELD,HOLD: NORMAL
SODIUM SERPL-SCNC: 139 MMOL/L (ref 136–145)
TROPONIN I BLD-MCNC: <0.04 NG/ML (ref 0–0.08)
WBC # BLD AUTO: 7.7 K/UL (ref 4.1–11.1)

## 2022-08-07 PROCEDURE — 74011000250 HC RX REV CODE- 250: Performed by: FAMILY MEDICINE

## 2022-08-07 PROCEDURE — 99285 EMERGENCY DEPT VISIT HI MDM: CPT

## 2022-08-07 PROCEDURE — 84484 ASSAY OF TROPONIN QUANT: CPT

## 2022-08-07 PROCEDURE — 71046 X-RAY EXAM CHEST 2 VIEWS: CPT

## 2022-08-07 PROCEDURE — 96374 THER/PROPH/DIAG INJ IV PUSH: CPT

## 2022-08-07 PROCEDURE — 74011250636 HC RX REV CODE- 250/636: Performed by: FAMILY MEDICINE

## 2022-08-07 PROCEDURE — 96375 TX/PRO/DX INJ NEW DRUG ADDON: CPT

## 2022-08-07 PROCEDURE — 74011250637 HC RX REV CODE- 250/637: Performed by: FAMILY MEDICINE

## 2022-08-07 PROCEDURE — C9113 INJ PANTOPRAZOLE SODIUM, VIA: HCPCS | Performed by: FAMILY MEDICINE

## 2022-08-07 PROCEDURE — 83690 ASSAY OF LIPASE: CPT

## 2022-08-07 PROCEDURE — 93005 ELECTROCARDIOGRAM TRACING: CPT

## 2022-08-07 PROCEDURE — 80053 COMPREHEN METABOLIC PANEL: CPT

## 2022-08-07 PROCEDURE — 85025 COMPLETE CBC W/AUTO DIFF WBC: CPT

## 2022-08-07 PROCEDURE — 36415 COLL VENOUS BLD VENIPUNCTURE: CPT

## 2022-08-07 RX ORDER — ONDANSETRON 2 MG/ML
4 INJECTION INTRAMUSCULAR; INTRAVENOUS
Status: COMPLETED | OUTPATIENT
Start: 2022-08-07 | End: 2022-08-07

## 2022-08-07 RX ORDER — HYDROGEN PEROXIDE 3 %
20 SOLUTION, NON-ORAL MISCELLANEOUS DAILY
Qty: 20 CAPSULE | Refills: 0 | Status: SHIPPED | OUTPATIENT
Start: 2022-08-07 | End: 2022-08-27

## 2022-08-07 RX ORDER — ATORVASTATIN CALCIUM 10 MG/1
TABLET, FILM COATED ORAL DAILY
COMMUNITY

## 2022-08-07 RX ADMIN — ONDANSETRON 4 MG: 2 INJECTION INTRAMUSCULAR; INTRAVENOUS at 13:56

## 2022-08-07 RX ADMIN — SODIUM CHLORIDE 40 MG: 9 INJECTION, SOLUTION INTRAMUSCULAR; INTRAVENOUS; SUBCUTANEOUS at 13:56

## 2022-08-07 RX ADMIN — ALUMINUM HYDROXIDE, MAGNESIUM HYDROXIDE, AND SIMETHICONE 40 ML: 200; 200; 20 SUSPENSION ORAL at 14:58

## 2022-08-07 NOTE — ED TRIAGE NOTES
Patient arrives with c/o mid-sternal chest pain x 5 days with intermittent nausea and lightheadedness. States chest pain began Tuesday as burning sensation, and began to feel chest tightness 3 days ago. Denies SOB, cough, fever, vomiting, urinary changes. Patient ambulated to triage and is A&O x 4.

## 2022-08-07 NOTE — DISCHARGE INSTRUCTIONS
Thank you for allowing us to provide you with medical care today. We realize that you have many choices for your emergency care needs. We thank you for choosing [facility]. Please choose us in the future for any continued health care needs. The exam and treatment you received in the emergency department were for an emergent problem and are not intended as complete care. It is important that you follow-up with a doctor. If your symptoms worsen or you do not improve should return to the emergency department. We are available 24 hours a day. Please make an appointment with your health care provider for follow-up of your emergency department visit. Take this sheet with you when you go to your follow-up visit.

## 2022-08-07 NOTE — ED PROVIDER NOTES
Patient is a 48-year-old male with past medical history of GERD, rheumatoid arthritis, hypertension presenting for evaluation of indigestion. Reports that he stopped taking his daily Prilosec approximately 2 weeks ago. Approximately 1 week ago, he developed bad indigestion with belching, regurgitation of acid when he lays down at night. He describes the pain as a burning sensation radiating into his chest.  Some associated nausea, no episodes of emesis. Denies any shortness of breath. Endorses some epigastric pain. Has seen cardiology in the past and had a normal stress test approximately 1 year ago. Past Medical History:   Diagnosis Date    Autoimmune disease ([facility] [address]     rheumatoid    GERD (gastroesophageal reflux disease)     Hypertension 2017    Peyronie's disease     Psychiatric disorder     h/o panic attack    Sleep apnea     not using machine at this time    Umbilical hernia        Past Surgical History:   Procedure Laterality Date    HX HEENT      broke; childhood    HX HERNIA REPAIR      HX ORTHOPAEDIC Left      plantar fasciatis    HX SHOULDER ARTHROSCOPY Left     *2    HX TONSILLECTOMY      HX UROLOGICAL  09/20/2019    Penis correction/ straightening - [facility] - Dr. Linh Dykes         Family History:   Problem Relation Age of Onset    Diabetes Mother     Diabetes Father     Heart Disease Father        Social History     Socioeconomic History    Marital status:      Spouse name: Not on file    Number of children: Not on file    Years of education: Not on file    Highest education level: Not on file   Occupational History    Not on file   Tobacco Use    Smoking status: Never    Smokeless tobacco: Current   Vaping Use    Vaping Use: Never used   Substance and Sexual Activity    Alcohol use:  Yes     Alcohol/week: 2.0 standard drinks     Types: 2 Cans of beer per week    Drug use: Never    Sexual activity: Yes     Partners: Female   Other Topics Concern    Not on file   Social History Narrative    Not on file     Social Determinants of Health     Financial Resource Strain: Not on file   Food Insecurity: Not on file   Transportation Needs: Not on file   Physical Activity: Not on file   Stress: Not on file   Social Connections: Not on file   Intimate Partner Violence: Not on file   Housing Stability: Not on file         ALLERGIES: Patient has no known allergies. Review of Systems   Constitutional:  Negative for unexpected weight change. HENT:  Negative for congestion. Eyes:  Negative for visual disturbance. Respiratory:  Positive for chest tightness. Negative for cough and shortness of breath. Cardiovascular:  Positive for chest pain. Negative for palpitations and leg swelling. Gastrointestinal:  Positive for abdominal pain and nausea. Negative for constipation, diarrhea and vomiting. Endocrine: Negative for polyuria. Genitourinary:  Negative for dysuria and flank pain. Musculoskeletal:  Negative for back pain. Skin:  Negative for color change. Allergic/Immunologic: Negative for immunocompromised state. Neurological:  Negative for dizziness and headaches. Hematological:  Negative for adenopathy. Psychiatric/Behavioral:  Negative for agitation. Vitals:    08/07/22 1340   BP: (!) 157/95   Pulse: 99   Resp: 20   Temp: 98.8 °F (37.1 °C)   SpO2: 97%   Weight: 96.6 kg (213 lb)   Height: 5' 6\" (1.676 m)            Physical Exam  Vitals and nursing note reviewed. Constitutional:       Appearance: He is well-developed. He is obese. HENT:      Head: Atraumatic. Eyes:      Pupils: Pupils are equal, round, and reactive to light. Cardiovascular:      Rate and Rhythm: Normal rate and regular rhythm. Pulses:           Posterior tibial pulses are 2+ on the right side and 2+ on the left side. Heart sounds: Normal heart sounds. Pulmonary:      Effort: Pulmonary effort is normal. No tachypnea or respiratory distress.       Breath sounds: Normal breath sounds. Chest:      Chest wall: No tenderness. Abdominal:      Palpations: Abdomen is soft. Tenderness: There is no guarding or rebound. Comments: Epigastric tenderness to palpation   Musculoskeletal:         General: Normal range of motion. Cervical back: Neck supple. Right lower leg: No tenderness. No edema. Left lower leg: No tenderness. No edema. Skin:     General: Skin is warm and dry. Capillary Refill: Capillary refill takes less than 2 seconds. Neurological:      General: No focal deficit present. Mental Status: He is alert and oriented to person, place, and time. Psychiatric:         Mood and Affect: Mood normal.         Behavior: Behavior normal.        MDM  Number of Diagnoses or Management Options  Gastroesophageal reflux disease, unspecified whether esophagitis present  Diagnosis management comments: Patient presenting with epigastric pain, chest pain. Suspect uncontrolled GERD based on description of symptoms, epigastric tenderness, recent cessation of Prilosec at home. We will treat patient with Protonix and Zofran and reevaluate symptoms. Other differentials include angina, ACS. Pain is not worse with physical activity, but worse after eating. Will obtain EKG and troponin for further evaluation. We will add on lipase as well to rule out pancreatitis. Chest x-ray to eval for cardiomegaly, pulmonary edema. 1430 -troponin negative and symptoms have been ongoing for greater than 5 days. Labs unremarkable despite mild hyperglycemia. Patient endorses that his blood sugar typically runs around this level. Suspect that symptoms are likely related to uncontrolled GERD in setting of stopping his Prilosec. We will treat patient with GI cocktail here today. Encourage patient to start his Prilosec again and follow-up with his primary care provider. Discussed my clinical impression(s), any labs and/or radiology results with the patient.  I answered any questions and addressed any concerns. Discussed the importance of following up with their primary care physician and/or specialist(s). Discussed signs or symptoms that would warrant return back to the ER for further evaluation. The patient is agreeable with discharge. ED EKG interpretation:1:42 PM  Rhythm: normal sinus rhythm; and regular. Rate (approx.): 97; Axis: normal; P wave: normal; ST/T wave: no concerning ST elevations or depressions; Other findings: unremarkable. EKG has also been evaluated by attending ED physician.      Jennie Barnett NP        Amount and/or Complexity of Data Reviewed  Clinical lab tests: ordered and reviewed  Tests in the radiology section of CPT®: ordered and reviewed  Discuss the patient with other providers: yes (Dr. Minal Chavez, ED Attending)           Procedures

## 2022-08-09 LAB
ATRIAL RATE: 97 BPM
CALCULATED P AXIS, ECG09: 44 DEGREES
CALCULATED R AXIS, ECG10: 44 DEGREES
CALCULATED T AXIS, ECG11: 21 DEGREES
DIAGNOSIS, 93000: NORMAL
P-R INTERVAL, ECG05: 144 MS
Q-T INTERVAL, ECG07: 326 MS
QRS DURATION, ECG06: 76 MS
QTC CALCULATION (BEZET), ECG08: 414 MS
VENTRICULAR RATE, ECG03: 97 BPM

## (undated) DEVICE — MAILER SLDE MICSCP 2 PLC --

## (undated) DEVICE — SLIDE MICRO PLAIN PRECLEAND --

## (undated) DEVICE — CONTAINER PREFIL FRMLN 40ML --

## (undated) DEVICE — GARMENT,MEDLINE,DVT,INT,CALF,MED, GEN2: Brand: MEDLINE

## (undated) DEVICE — REAG CYTO FIX 4OZ PMP SPRY --

## (undated) DEVICE — SET ADMIN L104IN 20 GTT GRAV RLER CLMP SMRT SITE NDL FREE

## (undated) DEVICE — 1860S HEALTH CARE RESPIRATOR N95 120EA/C: Brand: 3M™

## (undated) DEVICE — FCPS BIOP PULM RAD JAW 100CML -- BX/10 M00515180

## (undated) DEVICE — GOWN ISOL IMPERV UNIV, DISP, OPEN BACK, BLUE --

## (undated) DEVICE — BASIN EMESIS 500CC ROSE 250/CS 60/PLT: Brand: MEDEGEN MEDICAL PRODUCTS, LLC

## (undated) DEVICE — AIRLIFE™ ADULT OXYGEN MASK VINYL, UNDER-THE-CHIN STYLE, MEDIUM CONCENTRATION MASK WITH 7 FEET (2.1 M) CRUSH-RESISTANT OXYGEN TUBING: Brand: AIRLIFE™

## (undated) DEVICE — AIRLIFE™ MISTY MAX 10™ NEBULIZER WITH 7 FOOT (2.1 M) CRUSH RESISTANT OXYGEN TUBING, BAFFLED TEE ADAPTER (22 MM I.D./22 MM O.D.), MOUTHPIECE AND 6 INCH (15 CM) FLEXTUBE: Brand: AIRLIFE™

## (undated) DEVICE — BLADE, TONGUE, 6", STERILE: Brand: MEDLINE

## (undated) DEVICE — APPLICATOR FBR TIP L6IN COT TIP WOOD SHFT SWAB 2000 PER CA

## (undated) DEVICE — BE 105-8 BRONCHOSCOPE SWIVEL - 15MM ID/22MM OD (PATIENT PORT) X15MM OD (EQUIPMENT PORT). REUSABLE.  FITS COMPONENTS OF ADULT VENTILATOR CIRCUITS.  MOLDED OF POLYETHERIMIDE. INCLUDES TWO SILICONE RUBBER CAPS; ONE CAP ALLOWS FOR THE USE OF A SUCTIONING CATHETER WHILE THE OTHER CAP ALLOWS FOR THE USE OF A FIBER-OPTIC BRONCHOSCOPE WITHOUT SIGNIFICANT LOSS OF PEEP.: Brand: BE 105-8 BRONCHOSCOPE SWIVEL

## (undated) DEVICE — 1860 HEALTH CARE N95 MASK, 20EACH/BOX  6 BX/C: Brand: 3M™

## (undated) DEVICE — WANG TRANSBRONCHIAL HISTOLOGY NEEDLE FOR CENTRAL REGION, 1.9 MM X 130 CM: Brand: WANG

## (undated) DEVICE — SINGLE USE BIOPSY VALVE MAJ-210: Brand: SINGLE USE BIOPSY VALVE (STERILE)

## (undated) DEVICE — ENDOBRONCHIAL ULTRASOUND FINE NEEDLE BIOPSY (FNB) DEVICE: Brand: ACQUIRE™ PULMONARY

## (undated) DEVICE — TUBING, SUCTION, 1/4" X 12', STRAIGHT: Brand: MEDLINE

## (undated) DEVICE — SINGLE USE SUCTION VALVE MAJ-209: Brand: SINGLE USE SUCTION VALVE (STERILE)

## (undated) DEVICE — CATHETER SUCT TR FL TIP 14FR W/ O CTRL

## (undated) DEVICE — BITE BLOCK ENDOSCP UNIV AD 6 TO 9.4 MM

## (undated) DEVICE — STERILE POLYISOPRENE POWDER-FREE SURGICAL GLOVES: Brand: PROTEXIS

## (undated) DEVICE — SYR 10ML SLIP TIP 1/5ML GRAD --

## (undated) DEVICE — TOWEL,OR,DSP,ST,BLUE,STD,4/PK,20PK/CS: Brand: MEDLINE

## (undated) DEVICE — SLEEVE COMPR STD 12 IN FOR 165IN CALF COMFORT VENODYNE SYS

## (undated) DEVICE — FLEX ADVANTAGE 3000CC: Brand: FLEX ADVANTAGE

## (undated) DEVICE — MEDI-VAC SUCTION HIGH CAPACITY: Brand: CARDINAL HEALTH

## (undated) DEVICE — NDL BX EXCELON 21GX130CM --

## (undated) DEVICE — MASK SURG REG ORNG LEV 3 SFTY SEAL 4 LAYR SFT INNR LINING

## (undated) DEVICE — BRUSH CYTO BRONCHSCP 1.5/140MM -- CELLEBRITY

## (undated) DEVICE — AIRLIFE™ NASAL OXYGEN CANNULA CURVED, FLARED TIP WITH 14 FOOT (4.3 M) CRUSH-RESISTANT TUBING, OVER-THE-EAR STYLE: Brand: AIRLIFE™